# Patient Record
Sex: MALE | Race: OTHER | HISPANIC OR LATINO
[De-identification: names, ages, dates, MRNs, and addresses within clinical notes are randomized per-mention and may not be internally consistent; named-entity substitution may affect disease eponyms.]

---

## 2021-03-09 PROBLEM — Z00.00 ENCOUNTER FOR PREVENTIVE HEALTH EXAMINATION: Status: ACTIVE | Noted: 2021-03-09

## 2021-03-23 ENCOUNTER — NON-APPOINTMENT (OUTPATIENT)
Age: 46
End: 2021-03-23

## 2021-03-23 ENCOUNTER — APPOINTMENT (OUTPATIENT)
Dept: DERMATOLOGY | Facility: CLINIC | Age: 46
End: 2021-03-23
Payer: MEDICAID

## 2021-03-23 DIAGNOSIS — L91.8 OTHER HYPERTROPHIC DISORDERS OF THE SKIN: ICD-10-CM

## 2021-03-23 DIAGNOSIS — L65.9 NONSCARRING HAIR LOSS, UNSPECIFIED: ICD-10-CM

## 2021-03-23 DIAGNOSIS — L82.1 OTHER SEBORRHEIC KERATOSIS: ICD-10-CM

## 2021-03-23 PROCEDURE — 99203 OFFICE O/P NEW LOW 30 MIN: CPT

## 2021-03-23 PROCEDURE — 99072 ADDL SUPL MATRL&STAF TM PHE: CPT

## 2021-04-01 ENCOUNTER — APPOINTMENT (OUTPATIENT)
Dept: INTERNAL MEDICINE | Facility: CLINIC | Age: 46
End: 2021-04-01

## 2021-04-01 ENCOUNTER — APPOINTMENT (OUTPATIENT)
Dept: INTERNAL MEDICINE | Facility: CLINIC | Age: 46
End: 2021-04-01
Payer: MEDICAID

## 2021-04-01 ENCOUNTER — NON-APPOINTMENT (OUTPATIENT)
Age: 46
End: 2021-04-01

## 2021-04-01 VITALS
SYSTOLIC BLOOD PRESSURE: 108 MMHG | WEIGHT: 220 LBS | TEMPERATURE: 97.1 F | OXYGEN SATURATION: 98 % | HEIGHT: 69 IN | DIASTOLIC BLOOD PRESSURE: 82 MMHG | RESPIRATION RATE: 18 BRPM | HEART RATE: 64 BPM | BODY MASS INDEX: 32.58 KG/M2

## 2021-04-01 DIAGNOSIS — Z20.822 ENCOUNTER FOR PREPROCEDURAL LABORATORY EXAMINATION: ICD-10-CM

## 2021-04-01 DIAGNOSIS — G47.33 OBSTRUCTIVE SLEEP APNEA (ADULT) (PEDIATRIC): ICD-10-CM

## 2021-04-01 DIAGNOSIS — Z01.812 ENCOUNTER FOR PREPROCEDURAL LABORATORY EXAMINATION: ICD-10-CM

## 2021-04-01 DIAGNOSIS — E66.9 OBESITY, UNSPECIFIED: ICD-10-CM

## 2021-04-01 PROCEDURE — 99072 ADDL SUPL MATRL&STAF TM PHE: CPT

## 2021-04-01 PROCEDURE — 99204 OFFICE O/P NEW MOD 45 MIN: CPT | Mod: 25

## 2021-04-01 PROCEDURE — G0447 BEHAVIOR COUNSEL OBESITY 15M: CPT

## 2021-04-01 NOTE — ASSESSMENT
[FreeTextEntry1] : #1  Evaluate for obstructive sleep apnea.  Patient says he does have a history of snoring but is not having excessive daytime somnolence.  I am scheduling for him to have a overnight home sleep study.  I will review the results with him once they become available.  For now, RAMOS will continue strict weight reduction efforts, sleep on the sides, and avoid alcohol and sedating medicines. The patient knows fully not to drive or work with machinery if having any sleepiness or tiredness. \par \par #2 obesity.  See above.\par \par #3  History of asthma.  this is not recently active.  I am scheduling for Ramos to have full pulmonary function testing.

## 2021-04-01 NOTE — COUNSELING
[Potential consequences of obesity discussed] : Potential consequences of obesity discussed [Benefits of weight loss discussed] : Benefits of weight loss discussed [Decrease Portions] : decrease portions [____ min/wk Activity] : [unfilled] min/wk activity [FreeTextEntry2] : healthy foods [FreeTextEntry4] : 16

## 2021-04-01 NOTE — HISTORY OF PRESENT ILLNESS
[TextBox_4] : The first pulmonary appointment for this 45-year-old male with a history of obesity.  He was referred to evaluate for possible obstructive sleep apnea from the [facility].  He has no past history of obstructive sleep apnea nor has he had an overnight polysomnography study.  Patient does have a history of snoring, but denies having excessive daytime somnolence or having to take naps.  He does not awaken with morning headache.  He has noticed some dryness with breathing at nighttime.\par \par Is a non-smoker and has an infrequent alcohol drink.\par \par He denies recent coughing, wheezing, or dyspnea on exertion.  He tells me he was told that he had asthma at age 12.  He has not had recent symptoms of this.\par \par He is not having chest pain, palpitations, or lightheadedness.\par \par He is single and does work in [employer].

## 2021-04-27 ENCOUNTER — OUTPATIENT (OUTPATIENT)
Dept: OUTPATIENT SERVICES | Facility: HOSPITAL | Age: 46
LOS: 1 days | End: 2021-04-27
Payer: MEDICAID

## 2021-04-27 DIAGNOSIS — G47.33 OBSTRUCTIVE SLEEP APNEA (ADULT) (PEDIATRIC): ICD-10-CM

## 2021-04-27 PROCEDURE — 95806 SLEEP STUDY UNATT&RESP EFFT: CPT

## 2021-04-27 PROCEDURE — 95806 SLEEP STUDY UNATT&RESP EFFT: CPT | Mod: 26

## 2021-05-11 ENCOUNTER — NON-APPOINTMENT (OUTPATIENT)
Age: 46
End: 2021-05-11

## 2022-04-15 ENCOUNTER — APPOINTMENT (OUTPATIENT)
Dept: INTERNAL MEDICINE | Facility: CLINIC | Age: 47
End: 2022-04-15

## 2022-07-09 ENCOUNTER — OFFICE (OUTPATIENT)
Dept: URBAN - METROPOLITAN AREA CLINIC 102 | Facility: CLINIC | Age: 47
Setting detail: OPHTHALMOLOGY
End: 2022-07-09
Payer: MEDICAID

## 2022-07-09 DIAGNOSIS — H05.20: ICD-10-CM

## 2022-07-09 PROCEDURE — 92004 COMPRE OPH EXAM NEW PT 1/>: CPT | Performed by: OPHTHALMOLOGY

## 2022-07-09 ASSESSMENT — AXIALLENGTH_DERIVED
OS_AL: 23.8095
OD_AL: 23.9091

## 2022-07-09 ASSESSMENT — REFRACTION_AUTOREFRACTION
OS_AXIS: 121
OD_AXIS: 074
OD_CYLINDER: -0.25
OD_SPHERE: +0.50
OS_SPHERE: +0.75
OS_CYLINDER: -0.25

## 2022-07-09 ASSESSMENT — VISUAL ACUITY
OD_BCVA: 20/20
OS_BCVA: 20/20

## 2022-07-09 ASSESSMENT — SPHEQUIV_DERIVED
OS_SPHEQUIV: 0.625
OD_SPHEQUIV: 0.375

## 2022-07-09 ASSESSMENT — KERATOMETRY
OD_K1POWER_DIOPTERS: 42.25
OD_K2POWER_DIOPTERS: 42.25
OD_AXISANGLE_DEGREES: 090
OS_AXISANGLE_DEGREES: 090
OS_K1POWER_DIOPTERS: 42.25
OS_K2POWER_DIOPTERS: 42.25

## 2022-07-09 ASSESSMENT — CONFRONTATIONAL VISUAL FIELD TEST (CVF)
OD_FINDINGS: FULL
OS_FINDINGS: FULL

## 2022-07-09 ASSESSMENT — TONOMETRY
OD_IOP_MMHG: 19
OS_IOP_MMHG: 21
OD_IOP_MMHG: 20

## 2022-07-20 ENCOUNTER — OFFICE (OUTPATIENT)
Dept: URBAN - METROPOLITAN AREA CLINIC 102 | Facility: CLINIC | Age: 47
Setting detail: OPHTHALMOLOGY
End: 2022-07-20
Payer: MEDICAID

## 2022-07-20 DIAGNOSIS — H05.20: ICD-10-CM

## 2022-07-20 PROCEDURE — 92285 EXTERNAL OCULAR PHOTOGRAPHY: CPT | Performed by: OPHTHALMOLOGY

## 2022-07-20 PROCEDURE — 92012 INTRM OPH EXAM EST PATIENT: CPT | Performed by: OPHTHALMOLOGY

## 2022-07-20 ASSESSMENT — REFRACTION_AUTOREFRACTION
OD_CYLINDER: -0.25
OS_SPHERE: +0.75
OS_CYLINDER: -0.25
OD_AXIS: 074
OS_AXIS: 121
OD_SPHERE: +0.50

## 2022-07-20 ASSESSMENT — KERATOMETRY
OD_AXISANGLE_DEGREES: 090
OD_K2POWER_DIOPTERS: 42.25
OS_K1POWER_DIOPTERS: 42.25
OD_K1POWER_DIOPTERS: 42.25
OS_K2POWER_DIOPTERS: 42.25
OS_AXISANGLE_DEGREES: 090

## 2022-07-20 ASSESSMENT — CONFRONTATIONAL VISUAL FIELD TEST (CVF)
OD_FINDINGS: FULL
OS_FINDINGS: FULL

## 2022-07-20 ASSESSMENT — AXIALLENGTH_DERIVED
OS_AL: 23.8095
OD_AL: 23.9091

## 2022-07-20 ASSESSMENT — SPHEQUIV_DERIVED
OD_SPHEQUIV: 0.375
OS_SPHEQUIV: 0.625

## 2022-07-20 ASSESSMENT — VISUAL ACUITY
OD_BCVA: 20/20
OS_BCVA: 20/20

## 2022-07-20 ASSESSMENT — TONOMETRY
OD_IOP_MMHG: 15
OS_IOP_MMHG: 18